# Patient Record
Sex: FEMALE | Race: BLACK OR AFRICAN AMERICAN | NOT HISPANIC OR LATINO
[De-identification: names, ages, dates, MRNs, and addresses within clinical notes are randomized per-mention and may not be internally consistent; named-entity substitution may affect disease eponyms.]

---

## 2017-10-04 ENCOUNTER — APPOINTMENT (OUTPATIENT)
Dept: OBGYN | Facility: CLINIC | Age: 52
End: 2017-10-04

## 2017-11-08 ENCOUNTER — APPOINTMENT (OUTPATIENT)
Dept: OBGYN | Facility: CLINIC | Age: 52
End: 2017-11-08
Payer: COMMERCIAL

## 2017-11-08 VITALS
BODY MASS INDEX: 26.31 KG/M2 | WEIGHT: 143 LBS | HEIGHT: 62 IN | SYSTOLIC BLOOD PRESSURE: 120 MMHG | DIASTOLIC BLOOD PRESSURE: 70 MMHG

## 2017-11-08 DIAGNOSIS — Z01.411 ENCOUNTER FOR GYNECOLOGICAL EXAMINATION (GENERAL) (ROUTINE) WITH ABNORMAL FINDINGS: ICD-10-CM

## 2017-11-08 DIAGNOSIS — Z00.00 ENCOUNTER FOR GENERAL ADULT MEDICAL EXAMINATION W/OUT ABNORMAL FINDINGS: ICD-10-CM

## 2017-11-08 DIAGNOSIS — Z87.42 PERSONAL HISTORY OF OTHER DISEASES OF THE FEMALE GENITAL TRACT: ICD-10-CM

## 2017-11-08 PROCEDURE — 99396 PREV VISIT EST AGE 40-64: CPT

## 2017-11-13 LAB — CYTOLOGY CVX/VAG DOC THIN PREP: NORMAL

## 2018-01-16 DIAGNOSIS — R30.9 PAINFUL MICTURITION, UNSPECIFIED: ICD-10-CM

## 2018-01-17 ENCOUNTER — APPOINTMENT (OUTPATIENT)
Dept: OBGYN | Facility: CLINIC | Age: 53
End: 2018-01-17
Payer: COMMERCIAL

## 2018-01-17 PROCEDURE — 81003 URINALYSIS AUTO W/O SCOPE: CPT | Mod: QW

## 2018-01-22 ENCOUNTER — MEDICATION RENEWAL (OUTPATIENT)
Age: 53
End: 2018-01-22

## 2018-01-22 LAB — BACTERIA UR CULT: ABNORMAL

## 2018-12-04 ENCOUNTER — APPOINTMENT (OUTPATIENT)
Dept: OBGYN | Facility: CLINIC | Age: 53
End: 2018-12-04
Payer: COMMERCIAL

## 2018-12-04 VITALS
WEIGHT: 146 LBS | BODY MASS INDEX: 26.87 KG/M2 | SYSTOLIC BLOOD PRESSURE: 120 MMHG | HEIGHT: 62 IN | DIASTOLIC BLOOD PRESSURE: 72 MMHG

## 2018-12-04 DIAGNOSIS — Z78.9 OTHER SPECIFIED HEALTH STATUS: ICD-10-CM

## 2018-12-04 PROCEDURE — 99396 PREV VISIT EST AGE 40-64: CPT

## 2018-12-10 LAB — CYTOLOGY CVX/VAG DOC THIN PREP: NORMAL

## 2018-12-15 PROBLEM — Z78.9 SOCIAL ALCOHOL USE: Status: ACTIVE | Noted: 2018-12-15

## 2019-08-10 ENCOUNTER — EMERGENCY (EMERGENCY)
Facility: HOSPITAL | Age: 54
LOS: 1 days | Discharge: HOME | End: 2019-08-10
Attending: EMERGENCY MEDICINE
Payer: COMMERCIAL

## 2019-08-10 VITALS
RESPIRATION RATE: 18 BRPM | SYSTOLIC BLOOD PRESSURE: 127 MMHG | DIASTOLIC BLOOD PRESSURE: 77 MMHG | TEMPERATURE: 98 F | OXYGEN SATURATION: 98 % | HEART RATE: 72 BPM

## 2019-08-10 VITALS — HEIGHT: 62 IN | WEIGHT: 145.06 LBS

## 2019-08-10 PROCEDURE — 99284 EMERGENCY DEPT VISIT MOD MDM: CPT

## 2019-08-10 PROCEDURE — 99283 EMERGENCY DEPT VISIT LOW MDM: CPT

## 2019-08-10 RX ORDER — OLOPATADINE HYDROCHLORIDE 1 MG/ML
1 SOLUTION/ DROPS OPHTHALMIC
Qty: 1 | Refills: 0
Start: 2019-08-10 | End: 2019-08-16

## 2019-08-10 NOTE — ED PROVIDER NOTE - CHPI ED SYMPTOMS POS
ITCHING/DISCHARGE/**ATTENDING ADDENDUM (Dr. Rafael Morris): conjunctiva without redness./EYE LID SWELLING/DRAINAGE/PAIN/INFLAMMATION

## 2019-08-10 NOTE — ED PROVIDER NOTE - CLINICAL SUMMARY MEDICAL DECISION MAKING FREE TEXT BOX
54y/oF w/ no medical problems presents to the ED with L upper eyelid swelling/pressure, erythema, pruritis, and decreased visual acuity. Will give medication to relieve pruritis, erythema. Exam on slit lamp machine r/o septal cellultis . Needs oral antibiotics and F/u with ophthamalogist. 54y/oF w/ no medical problems presents to the ED with L upper eyelid swelling/pressure, erythema, pruritis, and decreased visual acuity. Will give medication to relieve pruritis, erythema. Exam on slit lamp machine r/o septal cellultis. Needs oral antibiotics and F/u with ophthalmologist. 54y/oF w/ no medical problems presents to the ED with L upper eyelid swelling/pressure, erythema, pruritis, and decreased visual acuity. Will give medication to relieve pruritis, erythema. Exam on slit lamp machine r/o septal cellultis. Needs oral antibiotics and F/u with ophthalmologist.  **ATTENDING MEDICAL DECISION MAKING/SYNTHESIS (Dr. Rafael Morris): I have reviewed the Chief Complaint, the HPI, the ROS, and have directly performed and confirmed the findings on the Physical Examination. I have reviewed the medical decision making with all providers, as applicable. The PROBLEM REPRESENTATION at this time is: 54-year-old woman with progressively worsening left upper eyelid soft-tissue swelling and erythema with itch s/p recently diagnosed stye, not relieved with warm compresses (tea bags) and topical antibiotics. The MOST LIKELY DIAGNOSIS, and the LIST OF DIFFERENTIAL DIAGNOSES, includes (but is not limited to) the following: chalazion, hordeolum refractory to topical antibiotics, periorbital cellulitis, orbital cellulitis (NO evidence), occult corneal foreign body/ulcer/abrasion (NO evidence), glaucoma (NO evidence), hyphema, hypopion, globe rupture/ocular trauma (NO evidence). The likelihood of each of these diagnoses has been appropriately considered in the context of this patient's presentation and my evaluation. PLAN: as described in EMR, including diagnostics, therapeutics and consultation as clinically warranted. I will continue to reevaluate the patient, including the results of all testing, and monitor response to therapy throughout the patient's course in the ED.

## 2019-08-10 NOTE — ED PROVIDER NOTE - PHYSICAL EXAMINATION
GENERAL: no acute distress, non-toxic appearing  HEAD: normocephalic, atraumatic  HEENT: stye present on upper L eyelid, L eye erythematous conjunctiva, L eye decreased visual acuity  CARDIAC: regular rate and rhythm, normal S1 and S2,  no appreciable murmurs  PULM: clear to ascultation bilaterally, no crackles, rales, rhonchi, or wheezing  GI: abdomen nondistended, soft, nontender, no guarding or rebound tenderness  NEURO: alert and oriented x 3, normal speech, PERRLA, EOMI, no focal motor or sensory deficits, nonantalgic gait  MSK: no visible deformities, no peripheral edema, calf tenderness/redness/swelling  SKIN: no visible rashes, dry, well-perfused  PSYCH: appropriate mood and affect GENERAL: no acute distress, non-toxic appearing  HEAD: normocephalic, atraumatic  HEENT: stye present on upper L eyelid, L eye erythematous conjunctiva, L eye decreased visual acuity, no sinus tenderness, no pain with eye movement, no foreign body seen on slit lamp exam, PERRLA, EOMI  CARDIAC: regular rate and rhythm, normal S1 and S2,  no appreciable murmurs  PULM: clear to ascultation bilaterally, no crackles, rales, rhonchi, or wheezing  GI: abdomen nondistended, soft, nontender, no guarding or rebound tenderness  NEURO: alert and oriented x 3, normal speech, PERRLA, EOMI, no focal motor or sensory deficits, nonantalgic gait  MSK: no visible deformities, no peripheral edema, calf tenderness/redness/swelling  SKIN: no visible rashes, dry, well-perfused  PSYCH: appropriate mood and affect GENERAL: no acute distress, non-toxic appearing  HEAD: normocephalic, atraumatic  HEENT: stye present on upper L eyelid, L eye erythematous conjunctiva, L eye decreased visual acuity, no sinus tenderness, no pain with eye movement, no foreign body seen on slit lamp exam, PERRLA, EOMI  CARDIAC: regular rate and rhythm, normal S1 and S2,  no appreciable murmurs  PULM: clear to ascultation bilaterally, no crackles, rales, rhonchi, or wheezing  GI: abdomen nondistended, soft, nontender, no guarding or rebound tenderness  NEURO: alert and oriented x 3, normal speech, PERRLA, EOMI, no focal motor or sensory deficits, nonantalgic gait  MSK: no visible deformities, no peripheral edema, calf tenderness/redness/swelling  SKIN: no visible rashes, dry, well-perfused  PSYCH: appropriate mood and affect  **ATTENDING ADDENDUM (Dr. Rafael Morris): I have reviewed and substantially contributed to the elements of the PE as documented above. I have directly performed an examination of this patient in conjunction with the other members (EM resident/PA/NP) of the patient care team. Of note, and in addition to the above, the left upper lid demonstrates soft-tissue swelling and erythema. POSITIVE scant discharge noted. NO ecchymosis. NO streaking. POSITIVE mild warmth and mild erythema. NO conjunctival hemorrhage, injection or redness. Clear cornea. NO evidence of abrasion or foreign body with evaluation of eye under SLE using fluorescein. NO evidence of foreign body with lid retraction and attempts as eversion. Extraocular muscle movements intact. Reactive, symmetrically equal pupils. NO left eye proptosis/exophthalmos. NO pain with EOM movement of left eye.

## 2019-08-10 NOTE — ED PROVIDER NOTE - PLAN OF CARE
**ATTENDING ADDENDUM (Dr. Rafael Morris): Goals of care include resolution of emergent/urgent symptoms and concerns, and restoration to baseline level of homeostasis.

## 2019-08-10 NOTE — ED PROVIDER NOTE - OBJECTIVE STATEMENT
55y/o F w/ no medical problems presents to the ED with L eye swelling. Patient states that she has had styes in the past but none as swollen as this. She says that the stye came on Sunday and she initially went to urgent care to get it looked at. They gave her instructions to apply warm tea compresses to remove the stye and sent her home with topical antibiotics. She states the eyelid has gotten more swollen and is now very pruritic, complains of pressure, decreased vision, and grey discharge from the eye. She denies headache, fevers, allergies, trauma. 55y/o F w/ no medical problems presents to the ED with L eye swelling. Patient states that she has had styes in the past but none as swollen as this. She says that the stye came on Sunday and she initially went to urgent care to get it looked at. They gave her instructions to apply warm tea compresses to remove the stye and sent her home with topical antibiotics. She states the eyelid has gotten more swollen and is now very pruritic, complains of pressure, decreased vision, and grey discharge from the eye. She denies headache, fevers, allergies, trauma.  **ATTENDING ADDENDUM (Dr. Rafael Morris): I attest that I have directly and personally interviewed and examined this patient and elicited a comparable history of present illness and review of systems as documented, along with my EM resident. I attest that I have made significant contributions to the documentation where necessary and as noted in the EMR.

## 2019-08-10 NOTE — ED PROVIDER NOTE - CHIEF COMPLAINT
The patient is a 54y Female complaining of The patient is a 54y Female complaining of left eyelid soft-tissue swelling and itch with mild warmth and erythema.

## 2019-08-10 NOTE — ED ADULT NURSE NOTE - OBJECTIVE STATEMENT
53 y/o female patient presents ambulatory to ED with complaint of left eyelid swelling x 1 week. Patient states the redness and swelling started and was applying warm compresses with minimal relief. Patient seen at urgent center, given antibiotics which per patient "made the redness and swelling worse". Patient states the left eye is also itchy associated with pressure. +blurry vision. Patient denies SOB and CP, N/V/D; afebrile in ED. Per patient, no trauma or injury to the eye. Patient has no significant PMHX

## 2019-08-10 NOTE — ED PROVIDER NOTE - NS ED ROS FT
GENERAL: denies fever, chills  HEENT: Left eye pressure, discharge, pruritic, redness, and decreased vision, Right eye normal  CARDIAC: denies chest pain, palpitations  PULM: denies dyspnea, wheezing, cough  GI: denies abdominal pain, nausea, vomiting, diarrhea, constipation, melena, hematochezia  : denies dysuria, frequency, incontinence, hematuria  NEURO: denies headache, motor weakness, sensory changes  MSK: denies joint or muscle pain  SKIN: denies new rashes, hives  HEME: denies active bleeding, bruising

## 2019-08-10 NOTE — ED PROVIDER NOTE - CARE PLAN
Principal Discharge DX:	Chalazion left upper eyelid  Goal:	**ATTENDING ADDENDUM (Dr. Rafael Morris): Goals of care include resolution of emergent/urgent symptoms and concerns, and restoration to baseline level of homeostasis.

## 2019-08-10 NOTE — ED PROVIDER NOTE - NSFOLLOWUPINSTRUCTIONS_ED_ALL_ED_FT
- Please follow up with Opthalmaologist [phone]    - Tylenol up to 650 mg every 8 hours as needed for pain and/or Motrin up to 600 mg every 6 hours as needed for pain.     Take any prescribed medications as instructed:     - Be sure to return to the ED if you develop new or worsening symptoms. Specific signs and symptoms to be vigilant of: fever or chills, chest pain, difficulty breathing, palpitations, loss of consciousness, headache, vision changes, slurred speech, difficulty swallowing or drooling, facial droop, weakness in the arms or legs, numbness or tingling, abdominal pain, nausea or vomiting, diarrhea, constipation, blood in the stool or urine, pain on urination, difficulty urinating.

## 2019-08-10 NOTE — ED PROVIDER NOTE - EYES [+], MLM
**ATTENDING ADDENDUM (Dr. Rafael Morris): POSITIVE eyelid soft-tissue swelling and mild erythema/DISCHARGE

## 2019-08-10 NOTE — ED PROVIDER NOTE - ATTENDING CONTRIBUTION TO CARE
**ATTENDING ADDENDUM (Dr. Rafael Morris): I attest that I have directly examined this patient and reviewed and formulated the diagnostic and therapeutic management plan in collaboration with the EM resident. Please see MDM note and remainder of EMR for findings from CC, HPI, ROS, and PE. (Finn)

## 2019-08-10 NOTE — ED PROVIDER NOTE - PROGRESS NOTE DETAILS
**ATTENDING ADDENDUM (Dr. Rafael Morris): patient serially evaluated throughout ED course. NO acute deterioration up to this time in the ED. Examination performed using SLE. Although chalazion is more likely, agree with prescription of oral antibiotics for possible emerging preseptal (periorbital) cellulitis. Extensive anticipatory guidance provided to patient +/or family member(s). Agree with discharge home with antibiotics and with continued warm compressed and close outpatient followup with ophthalmologist.

## 2019-08-11 RX ORDER — OLOPATADINE HYDROCHLORIDE 1 MG/ML
1 SOLUTION/ DROPS OPHTHALMIC
Qty: 1 | Refills: 0
Start: 2019-08-11 | End: 2019-08-17

## 2019-08-11 NOTE — ED POST DISCHARGE NOTE - ADDITIONAL DOCUMENTATION
pt called, abx and eye drops not sent to pharmacy. Per chart pt without meds eprescribed, will resend to pharmacy, and verified pt has ophthalmology f.u. -Luann Pedersen PA-C

## 2019-08-12 ENCOUNTER — APPOINTMENT (OUTPATIENT)
Dept: OPHTHALMOLOGY | Facility: CLINIC | Age: 54
End: 2019-08-12
Payer: COMMERCIAL

## 2019-08-12 ENCOUNTER — NON-APPOINTMENT (OUTPATIENT)
Age: 54
End: 2019-08-12

## 2019-08-12 PROBLEM — Z78.9 OTHER SPECIFIED HEALTH STATUS: Chronic | Status: ACTIVE | Noted: 2019-08-10

## 2019-08-12 PROCEDURE — 92002 INTRM OPH EXAM NEW PATIENT: CPT

## 2019-12-09 ENCOUNTER — APPOINTMENT (OUTPATIENT)
Dept: OBGYN | Facility: CLINIC | Age: 54
End: 2019-12-09
Payer: COMMERCIAL

## 2019-12-09 VITALS
BODY MASS INDEX: 27.6 KG/M2 | SYSTOLIC BLOOD PRESSURE: 120 MMHG | WEIGHT: 150 LBS | HEIGHT: 62 IN | DIASTOLIC BLOOD PRESSURE: 70 MMHG

## 2019-12-09 DIAGNOSIS — Z01.411 ENCOUNTER FOR GYNECOLOGICAL EXAMINATION (GENERAL) (ROUTINE) WITH ABNORMAL FINDINGS: ICD-10-CM

## 2019-12-09 PROCEDURE — 99396 PREV VISIT EST AGE 40-64: CPT

## 2019-12-09 RX ORDER — NITROFURANTOIN MACROCRYSTALS 100 MG/1
100 CAPSULE ORAL
Qty: 14 | Refills: 0 | Status: DISCONTINUED | COMMUNITY
Start: 2018-01-22 | End: 2019-12-09

## 2019-12-12 LAB — CYTOLOGY CVX/VAG DOC THIN PREP: ABNORMAL

## 2021-01-22 NOTE — ED PROVIDER NOTE - NS ED ATTENDING STATEMENT MOD
Discharged
I have personally seen and examined this patient.  I have fully participated in the care of this patient. I have reviewed all pertinent clinical information, including history, physical exam, plan and the Resident’s note and agree except as noted.

## 2022-03-22 ENCOUNTER — APPOINTMENT (OUTPATIENT)
Dept: OBGYN | Facility: CLINIC | Age: 57
End: 2022-03-22
Payer: COMMERCIAL

## 2022-03-22 VITALS
SYSTOLIC BLOOD PRESSURE: 112 MMHG | DIASTOLIC BLOOD PRESSURE: 72 MMHG | HEIGHT: 62 IN | WEIGHT: 146 LBS | BODY MASS INDEX: 26.87 KG/M2

## 2022-03-22 PROCEDURE — 99396 PREV VISIT EST AGE 40-64: CPT

## 2022-03-28 LAB — CYTOLOGY CVX/VAG DOC THIN PREP: ABNORMAL

## 2023-03-27 ENCOUNTER — APPOINTMENT (OUTPATIENT)
Dept: OBGYN | Facility: CLINIC | Age: 58
End: 2023-03-27
Payer: COMMERCIAL

## 2023-03-27 VITALS
SYSTOLIC BLOOD PRESSURE: 96 MMHG | BODY MASS INDEX: 25.76 KG/M2 | HEIGHT: 62 IN | DIASTOLIC BLOOD PRESSURE: 62 MMHG | WEIGHT: 140 LBS

## 2023-03-27 DIAGNOSIS — Z01.419 ENCOUNTER FOR GYNECOLOGICAL EXAMINATION (GENERAL) (ROUTINE) W/OUT ABNORMAL FINDINGS: ICD-10-CM

## 2023-03-27 PROCEDURE — 99396 PREV VISIT EST AGE 40-64: CPT

## 2023-03-31 LAB — CYTOLOGY CVX/VAG DOC THIN PREP: ABNORMAL

## 2024-04-01 ENCOUNTER — NON-APPOINTMENT (OUTPATIENT)
Age: 59
End: 2024-04-01

## 2024-04-01 ENCOUNTER — APPOINTMENT (OUTPATIENT)
Dept: OBGYN | Facility: CLINIC | Age: 59
End: 2024-04-01
Payer: COMMERCIAL

## 2024-04-01 VITALS
WEIGHT: 142 LBS | SYSTOLIC BLOOD PRESSURE: 122 MMHG | DIASTOLIC BLOOD PRESSURE: 72 MMHG | HEIGHT: 62 IN | BODY MASS INDEX: 26.13 KG/M2

## 2024-04-01 DIAGNOSIS — N89.8 OTHER SPECIFIED NONINFLAMMATORY DISORDERS OF VAGINA: ICD-10-CM

## 2024-04-01 DIAGNOSIS — Z11.3 ENCOUNTER FOR SCREENING FOR INFECTIONS WITH A PREDOMINANTLY SEXUAL MODE OF TRANSMISSION: ICD-10-CM

## 2024-04-01 PROCEDURE — 81003 URINALYSIS AUTO W/O SCOPE: CPT | Mod: QW

## 2024-04-01 PROCEDURE — 99213 OFFICE O/P EST LOW 20 MIN: CPT | Mod: 25

## 2024-04-03 LAB
BILIRUB UR QL STRIP: NORMAL
C TRACH RRNA SPEC QL NAA+PROBE: NOT DETECTED
CANDIDA VAG CYTO: NOT DETECTED
G VAGINALIS+PREV SP MTYP VAG QL MICRO: NOT DETECTED
GLUCOSE UR-MCNC: NORMAL
HCG UR QL: 0.2 EU/DL
HGB UR QL STRIP.AUTO: NORMAL
KETONES UR-MCNC: NORMAL
LEUKOCYTE ESTERASE UR QL STRIP: NORMAL
N GONORRHOEA RRNA SPEC QL NAA+PROBE: NOT DETECTED
NITRITE UR QL STRIP: NORMAL
PH UR STRIP: 7
PROT UR STRIP-MCNC: NORMAL
SOURCE AMPLIFICATION: NORMAL
SP GR UR STRIP: 1.02
T VAGINALIS VAG QL WET PREP: NOT DETECTED

## 2024-04-04 RX ORDER — AMPICILLIN 500 MG/1
500 CAPSULE ORAL 4 TIMES DAILY
Qty: 20 | Refills: 0 | Status: ACTIVE | COMMUNITY
Start: 2024-04-04 | End: 1900-01-01

## 2024-04-22 ENCOUNTER — APPOINTMENT (OUTPATIENT)
Dept: OBGYN | Facility: CLINIC | Age: 59
End: 2024-04-22

## 2024-12-16 ENCOUNTER — APPOINTMENT (OUTPATIENT)
Dept: OBGYN | Facility: CLINIC | Age: 59
End: 2024-12-16
Payer: COMMERCIAL

## 2024-12-16 VITALS
BODY MASS INDEX: 25.95 KG/M2 | WEIGHT: 141 LBS | SYSTOLIC BLOOD PRESSURE: 134 MMHG | DIASTOLIC BLOOD PRESSURE: 78 MMHG | HEIGHT: 62 IN

## 2024-12-16 DIAGNOSIS — Z01.419 ENCOUNTER FOR GYNECOLOGICAL EXAMINATION (GENERAL) (ROUTINE) W/OUT ABNORMAL FINDINGS: ICD-10-CM

## 2024-12-16 PROCEDURE — 99396 PREV VISIT EST AGE 40-64: CPT

## 2024-12-23 LAB — CYTOLOGY CVX/VAG DOC THIN PREP: ABNORMAL
